# Patient Record
Sex: MALE | Race: WHITE
[De-identification: names, ages, dates, MRNs, and addresses within clinical notes are randomized per-mention and may not be internally consistent; named-entity substitution may affect disease eponyms.]

---

## 2017-02-15 ENCOUNTER — HOSPITAL ENCOUNTER (EMERGENCY)
Dept: HOSPITAL 47 - EC | Age: 67
Discharge: HOME | End: 2017-02-15
Payer: COMMERCIAL

## 2017-02-15 VITALS — DIASTOLIC BLOOD PRESSURE: 78 MMHG | HEART RATE: 80 BPM | TEMPERATURE: 98 F | SYSTOLIC BLOOD PRESSURE: 140 MMHG

## 2017-02-15 VITALS — RESPIRATION RATE: 20 BRPM

## 2017-02-15 DIAGNOSIS — S70.01XA: ICD-10-CM

## 2017-02-15 DIAGNOSIS — S06.0X0A: Primary | ICD-10-CM

## 2017-02-15 DIAGNOSIS — S40.011A: ICD-10-CM

## 2017-02-15 DIAGNOSIS — Z88.2: ICD-10-CM
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
## 2017-02-15 DIAGNOSIS — Z88.5: ICD-10-CM

## 2017-02-15 PROCEDURE — 99284 EMERGENCY DEPT VISIT MOD MDM: CPT

## 2017-02-15 PROCEDURE — 72125 CT NECK SPINE W/O DYE: CPT

## 2017-02-15 PROCEDURE — 96372 THER/PROPH/DIAG INJ SC/IM: CPT

## 2017-02-15 PROCEDURE — 73030 X-RAY EXAM OF SHOULDER: CPT

## 2017-02-15 PROCEDURE — 70450 CT HEAD/BRAIN W/O DYE: CPT

## 2017-02-15 PROCEDURE — 73502 X-RAY EXAM HIP UNI 2-3 VIEWS: CPT

## 2017-02-15 NOTE — ED
General Adult HPI





- General


Chief complaint: Fall


Stated complaint: Fall, [facility]


Time Seen by Provider: 02/15/17 11:09


Source: patient, RN notes reviewed, old records reviewed


Mode of arrival: ambulatory


Limitations: no limitations





- History of Present Illness


Initial comments: 





This is a 58-year-old male ER for evaluation of fall.  Patient has no 

significant 20 medical history, no headache chest pain shortness of breath or 

abdominal pain prior to fall.  Patient did have a slip and fall from states 

fall as mechanical on the ice, not on blood thinners, patient did fall 

backwards landing on right hip right shoulder and hitting head.  No loss of 

consciousness.  Patient denies any other complaints of pain.  Symptoms evaluate 

better when he is walking worse when he is sitting.





- Related Data


 Allergies











Allergy/AdvReac Type Severity Reaction Status Date / Time


 


hydrocodone [From Lortab] Allergy  Swelling Verified 02/15/17 11:38


 


Sulfa (Sulfonamide Allergy  Unknown Verified 02/15/17 11:38





Antibiotics)   Childhood  














Review of Systems


ROS Statement: 


Those systems with pertinent positive or pertinent negative responses have been 

documented in the HPI.





ROS Other: All systems not noted in ROS Statement are negative.





Past Medical History


Past Medical History: Coronary Artery Disease (CAD), Chest Pain / Angina, 

Hyperlipidemia, Hypertension, Myocardial Infarction (MI)


History of Any Multi-Drug Resistant Organisms: None Reported


Past Surgical History: Heart Catheterization With Stent


Additional Past Surgical History / Comment(s): cataract


Past Psychological History: No Psychological Hx Reported


Smoking Status: Never smoker


Past Alcohol Use History: None Reported


Past Drug Use History: None Reported





General Exam





- General Exam Comments


Initial Comments: 





No bony tenderness


Limitations: no limitations


General appearance: alert, in no apparent distress


Head exam: Present: atraumatic, normocephalic, normal inspection


Eye exam: Present: normal appearance, PERRL, EOMI.  Absent: scleral icterus, 

conjunctival injection, periorbital swelling


ENT exam: Present: normal exam, mucous membranes moist


Neck exam: Present: normal inspection.  Absent: tenderness, meningismus, 

lymphadenopathy


Respiratory exam: Present: normal lung sounds bilaterally.  Absent: respiratory 

distress, wheezes, rales, rhonchi, stridor


Cardiovascular Exam: Present: regular rate, normal rhythm, normal heart sounds.

  Absent: systolic murmur, diastolic murmur, rubs, gallop, clicks


GI/Abdominal exam: Present: soft, normal bowel sounds.  Absent: distended, 

tenderness, guarding, rebound, rigid


Extremities exam: Present: normal inspection, full ROM, normal capillary 

refill.  Absent: tenderness, pedal edema, joint swelling, calf tenderness


Back exam: Present: normal inspection


Neurological exam: Present: alert, oriented X3, CN II-XII intact


Psychiatric exam: Present: normal affect, normal mood


Skin exam: Present: warm, dry, intact, normal color.  Absent: rash





Course





 Vital Signs











  02/15/17





  10:32


 


Temperature 98.2 F


 


Pulse Rate 61


 


Respiratory 20





Rate 


 


Blood Pressure 153/73


 


O2 Sat by Pulse 100





Oximetry 














- Reevaluation(s)


Reevaluation #1: 





02/15/17 11:51


Pain at this time is improved





Medical Decision Making





- Medical Decision Making





6 emailed ESS also to follow with right hip and right shoulder contusion as 

well as closed head injury.  Patient will be discharged home to continue pain 

medication that he currently is on





- Radiology Data


Radiology results: report reviewed (CT brain C-spine negative for acute disease

, x-ray right shoulder x-ray were negative for traumatic injury), image reviewed





Disposition


Clinical Impression: 


 Fall, Contusion of right shoulder, Contusion of hip, right, Head injury





Disposition: HOME SELF-CARE


Condition: Good


Instructions:  Contusion in Adults (ED), Concussion (ED)


Referrals: 


Nonstaff,Physician [Primary Care Provider] - 1-2 days

## 2017-02-15 NOTE — CT
EXAMINATION TYPE: CT brain eloina wo con

 

DATE OF EXAM: 2/15/2017 12:22 PM

 

COMPARISON: NONE

 

HISTORY: Slip and fall on ice this am

 

CT DLP: 1788.40 mGycm, Automated exposure control for dose reduction was used.

 

CONTRAST: None

 

CT of the brain is performed utilizing 3 mm thick sections through the posterior fossa and 3 mm thick
 sections through the remaining calvarium.  

 

Study is performed within 24 hours of arrival to the hospital.

 

 No abnormal hyperdensity is present to suggest an acute intracranial hemorrhage.

No mass lesion is evident.

No acute infarcts are evident.

Ventricles and sulci are appropriate for the patient age.  

 

Paranasal sinuses and mastoid air cells within the field-of-view are clear. 

 

IMPRESSIONS:

1. No acute intracranial process.

 

CT cervical spine.

 

COMPARISON: None

 

CT of the cervical spine is performed in the axial plane at 2 mm thick sections.  Reconstructed image
s in the coronal, and sagittal plane are reviewed on the computer. 

 

No acute fractures are evident.

Vertebral body alignment is normal.

Disc space narrowing is present C5-4-5 C5-C6 C6-7. Uncovertebral joint hypertrophy has bilateral mode
rate to severe foraminal stenosis. Endplate spurring is anterior thecal sac compression. Some associa
devan disc material may be present. Uncovertebral joint hypertrophy is present C5-6 on the left with mo
derate left foraminal stenosis. Endplate changes with associated disc material is present C6-7 with a
nterior thecal sac compression. No AP spinal canal stenosis is present.

Vertebral body heights are preserved.

No spinal canal stenosis is evident.

 

IMPRESSIONS:

1. Degenerative disc changes uncovertebral joint hypertrophy and endplate spurring with associated di
sc material contributing to foraminal narrowing and anterior thecal sac compression discussed above.

2. No acute abnormality.

## 2017-02-15 NOTE — XR
EXAMINATION TYPE: XR Hip Complete RT

 

DATE OF EXAM: 2/15/2017 12:09 PM

 

COMPARISON: NONE

 

HISTORY: Pain

 

TECHNIQUE: 2 views right hip

 

FINDINGS: No acute fractures are evident. The femoral head are difficult with the acetabulum. Vascula
r calcification is present.

 

IMPRESSION:

1.  No acute osseous elements right hip. Follow-up can be performed as clinically indicated.

## 2017-02-15 NOTE — XR
EXAMINATION TYPE: XR shoulder complete RT

 

DATE OF EXAM: 2/15/2017 12:09 PM

 

COMPARISON: NONE

 

HISTORY: Pain, fall on right side

 

TECHNIQUE:  Shoulder examined in 3 views

 

FINDINGS: 

The humeral head articulates with the glenoid.

No acromioclavicular joint hypertrophy is evident. There may be some hooking with downward impingemen
t of the distal acromion

No acute fractures or dislocations are evident.

 

A follow up study can be performed 7-10 days from acute trauma for continued pain.

 

IMPRESSION:

1. No acute osseous abnormality.

2. Some osteoarthritic degenerative change and acromion variation, which could contribute to impingem
ent syndrome. Consider MRI for additional evaluation.

## 2017-03-08 ENCOUNTER — HOSPITAL ENCOUNTER (OUTPATIENT)
Dept: HOSPITAL 47 - RADUSWWP | Age: 67
Discharge: HOME | End: 2017-03-08
Payer: COMMERCIAL

## 2017-03-08 DIAGNOSIS — S83.91XA: ICD-10-CM

## 2017-03-08 DIAGNOSIS — S80.11XA: Primary | ICD-10-CM

## 2017-03-08 NOTE — XR
EXAMINATION TYPE: XR tibia fibula RT

 

DATE OF EXAM: 3/8/2017 4:33 PM

 

CLINICAL HISTORY: pain

 

TECHNIQUE:  AP and lateral images of the right tibia and fibula are obtained.

 

COMPARISON: None.

 

FINDINGS:  There is no acute fracture/dislocation evident. The joint spaces  appear within normal cartagena
its.  The overlying soft tissue appears unremarkable.

 

IMPRESSION:  

 

There is no acute fracture or dislocation seen.

 

ICD 10 NO FRACTURE, INITIAL EVALUATION

## 2017-03-08 NOTE — US
EXAMINATION TYPE: US venous doppler duplex LE RT

 

DATE OF EXAM: 3/8/2017 4:18 PM

 

COMPARISON: NONE

 

CLINICAL HISTORY: RLE Pain and Swelling 580.11XA,583.91XA. rt knee pain and swelling, no prev dvt

 

SIDE PERFORMED: right  

 

VESSELS IMAGED:

External Iliac Vein (EIV)

Common Femoral Vein

Deep Femoral Vein

Femoral Vein

Popliteal Vein

Proximal Calf Veins

 

Findings: No evidence for filling defect. The deep venous structures demonstrate normal compressibili
ty and augmentation of flow.

 

Right Leg:  neg for RLE dvt

 

 

 

Results given to Brit in the office at the time of the exam.

 

IMPRESSION:  Negative for DVT.

## 2019-01-07 ENCOUNTER — HOSPITAL ENCOUNTER (OUTPATIENT)
Dept: HOSPITAL 47 - ORWHC2ENDO | Age: 69
End: 2019-01-07
Payer: OTHER GOVERNMENT

## 2019-01-07 VITALS — RESPIRATION RATE: 16 BRPM | TEMPERATURE: 98.1 F

## 2019-01-07 VITALS — HEART RATE: 61 BPM | DIASTOLIC BLOOD PRESSURE: 86 MMHG | SYSTOLIC BLOOD PRESSURE: 132 MMHG

## 2019-01-07 VITALS — BODY MASS INDEX: 30.4 KG/M2

## 2019-01-07 DIAGNOSIS — Z88.5: ICD-10-CM

## 2019-01-07 DIAGNOSIS — Z79.82: ICD-10-CM

## 2019-01-07 DIAGNOSIS — M19.90: ICD-10-CM

## 2019-01-07 DIAGNOSIS — Z12.11: Primary | ICD-10-CM

## 2019-01-07 DIAGNOSIS — Z79.899: ICD-10-CM

## 2019-01-07 DIAGNOSIS — K21.9: ICD-10-CM

## 2019-01-07 DIAGNOSIS — E11.9: ICD-10-CM

## 2019-01-07 DIAGNOSIS — Z88.2: ICD-10-CM

## 2019-01-07 DIAGNOSIS — I25.10: ICD-10-CM

## 2019-01-07 DIAGNOSIS — I10: ICD-10-CM

## 2019-01-07 DIAGNOSIS — I25.2: ICD-10-CM

## 2019-01-07 DIAGNOSIS — Z86.010: ICD-10-CM

## 2019-01-07 DIAGNOSIS — K29.50: ICD-10-CM

## 2019-01-07 DIAGNOSIS — F17.200: ICD-10-CM

## 2019-01-07 DIAGNOSIS — Z79.84: ICD-10-CM

## 2019-01-07 DIAGNOSIS — E78.5: ICD-10-CM

## 2019-01-07 DIAGNOSIS — K44.9: ICD-10-CM

## 2019-01-07 LAB — GLUCOSE BLD-MCNC: 122 MG/DL (ref 75–99)

## 2019-01-07 PROCEDURE — 45378 DIAGNOSTIC COLONOSCOPY: CPT

## 2019-01-07 PROCEDURE — 88305 TISSUE EXAM BY PATHOLOGIST: CPT

## 2019-01-07 PROCEDURE — 88342 IMHCHEM/IMCYTCHM 1ST ANTB: CPT

## 2019-01-07 PROCEDURE — 43239 EGD BIOPSY SINGLE/MULTIPLE: CPT

## 2019-01-07 NOTE — P.PCN
Date of Procedure: 01/07/19


Procedure(s) Performed: 


Procedure: 1. Esophagogastroduodenoscopy and biopsy.  2. Total colonoscopy.





Preoperative diagnosis: Dysphagia and history of polyps.





Postoperative diagnosis: 1. Small sliding hiatal hernia with no obvious 

esophagitis or complicated reflux disease.  2. Mild antral gastritis.  3. 

Biopsies obtained from the antrum and esophagus.  Colonic exam within normal 

limits.





Preparation: HalfLytely prep.





Sedation: Was provided by anesthesia.





Brief clinical history: The patient is a 68-year-old male who is referred for 

this evaluation because of history of polyps in addition to dysphagia for the 

last year or so.  His last colonoscopy may have been around 10 years ago.  The 

patient denied weight loss or other alarm symptoms.  He has taken medical 

therapy for acid reflux.





Procedure: With the patient on his left lateral decubitus position and after 

informed consent and adequate sedation, I passed the [employer] H190 video 

upper endoscope through the cricopharyngeus down the esophagus.  GE junction 

was around 40 cm from the incisors and there was a small sliding hiatal hernia 

but no obvious esophagitis or complicated reflux disease.  The endoscope was 

then passed into the stomach which was insufflated with air and inspected in 

detail including the retroflex view in the cardia.  There was some mottling and 

erythema in the antrum but no ulcers or erosions.  Pyloric channel, duodenal 

bulb, post bulbar area and descending duodenum appeared within normal limits.  

I obtained biopsies from the antrum and esophagus then the endoscope was 

withdrawn I then then proceeded with the colonoscopy.





Perianal area did not show any fissures or fistulas.  There were no masses felt 

on digital rectal examination.  The Olympus CFH 190L video colonoscope was then 

inserted in the rectum in the usual fashion and advanced to the cecum.  The 

mucosa appeared healthy.  No obvious polyps or tumors were seen or any obvious 

diverticular disease or other pathology.  I retroflexed the endoscope in the 

rectum before the endoscope was withdrawn.





The patient tolerated the procedure well.





Plan: The patient was reassured.  Will await biopsy results.  I recommended 

repeat colonoscopy in 5 years.  Further workup of his dysphagia to include 

motility studies will depend on his symptoms and overall nutritional status.

## 2020-02-23 ENCOUNTER — HOSPITAL ENCOUNTER (OUTPATIENT)
Dept: HOSPITAL 47 - EC | Age: 70
Setting detail: OBSERVATION
LOS: 1 days | Discharge: HOME | End: 2020-02-24
Attending: INTERNAL MEDICINE | Admitting: INTERNAL MEDICINE
Payer: OTHER GOVERNMENT

## 2020-02-23 VITALS — RESPIRATION RATE: 18 BRPM

## 2020-02-23 DIAGNOSIS — I44.0: ICD-10-CM

## 2020-02-23 DIAGNOSIS — M19.90: ICD-10-CM

## 2020-02-23 DIAGNOSIS — F32.9: ICD-10-CM

## 2020-02-23 DIAGNOSIS — I25.10: ICD-10-CM

## 2020-02-23 DIAGNOSIS — Z88.5: ICD-10-CM

## 2020-02-23 DIAGNOSIS — R00.1: ICD-10-CM

## 2020-02-23 DIAGNOSIS — Z79.82: ICD-10-CM

## 2020-02-23 DIAGNOSIS — Z79.84: ICD-10-CM

## 2020-02-23 DIAGNOSIS — M10.9: ICD-10-CM

## 2020-02-23 DIAGNOSIS — R07.89: Primary | ICD-10-CM

## 2020-02-23 DIAGNOSIS — Z79.899: ICD-10-CM

## 2020-02-23 DIAGNOSIS — Z98.42: ICD-10-CM

## 2020-02-23 DIAGNOSIS — Z88.2: ICD-10-CM

## 2020-02-23 DIAGNOSIS — I10: ICD-10-CM

## 2020-02-23 DIAGNOSIS — E11.9: ICD-10-CM

## 2020-02-23 DIAGNOSIS — F41.9: ICD-10-CM

## 2020-02-23 DIAGNOSIS — T44.7X5A: ICD-10-CM

## 2020-02-23 DIAGNOSIS — Z87.891: ICD-10-CM

## 2020-02-23 DIAGNOSIS — E78.00: ICD-10-CM

## 2020-02-23 DIAGNOSIS — Z95.5: ICD-10-CM

## 2020-02-23 DIAGNOSIS — Z82.49: ICD-10-CM

## 2020-02-23 DIAGNOSIS — I25.2: ICD-10-CM

## 2020-02-23 DIAGNOSIS — K21.9: ICD-10-CM

## 2020-02-23 DIAGNOSIS — E78.5: ICD-10-CM

## 2020-02-23 DIAGNOSIS — Z98.41: ICD-10-CM

## 2020-02-23 LAB
ALBUMIN SERPL-MCNC: 4.5 G/DL (ref 3.5–5)
ALP SERPL-CCNC: 77 U/L (ref 38–126)
ALT SERPL-CCNC: 46 U/L (ref 4–49)
ANION GAP SERPL CALC-SCNC: 8 MMOL/L
APTT BLD: 25.8 SEC (ref 22–30)
AST SERPL-CCNC: 36 U/L (ref 17–59)
BASOPHILS # BLD AUTO: 0 K/UL (ref 0–0.2)
BASOPHILS NFR BLD AUTO: 1 %
BUN SERPL-SCNC: 17 MG/DL (ref 9–20)
CALCIUM SPEC-MCNC: 9.6 MG/DL (ref 8.4–10.2)
CHLORIDE SERPL-SCNC: 103 MMOL/L (ref 98–107)
CO2 SERPL-SCNC: 25 MMOL/L (ref 22–30)
EOSINOPHIL # BLD AUTO: 0.3 K/UL (ref 0–0.7)
EOSINOPHIL NFR BLD AUTO: 6 %
ERYTHROCYTE [DISTWIDTH] IN BLOOD BY AUTOMATED COUNT: 4.53 M/UL (ref 4.3–5.9)
ERYTHROCYTE [DISTWIDTH] IN BLOOD: 13.4 % (ref 11.5–15.5)
GLUCOSE SERPL-MCNC: 131 MG/DL (ref 74–99)
HCT VFR BLD AUTO: 42.4 % (ref 39–53)
HGB BLD-MCNC: 14.3 GM/DL (ref 13–17.5)
INR PPP: 1 (ref ?–1.2)
LYMPHOCYTES # SPEC AUTO: 1.4 K/UL (ref 1–4.8)
LYMPHOCYTES NFR SPEC AUTO: 27 %
MAGNESIUM SPEC-SCNC: 1.8 MG/DL (ref 1.6–2.3)
MCH RBC QN AUTO: 31.6 PG (ref 25–35)
MCHC RBC AUTO-ENTMCNC: 33.7 G/DL (ref 31–37)
MCV RBC AUTO: 93.6 FL (ref 80–100)
MONOCYTES # BLD AUTO: 0.3 K/UL (ref 0–1)
MONOCYTES NFR BLD AUTO: 6 %
NEUTROPHILS # BLD AUTO: 2.9 K/UL (ref 1.3–7.7)
NEUTROPHILS NFR BLD AUTO: 58 %
PLATELET # BLD AUTO: 125 K/UL (ref 150–450)
POTASSIUM SERPL-SCNC: 4.2 MMOL/L (ref 3.5–5.1)
PROT SERPL-MCNC: 7.1 G/DL (ref 6.3–8.2)
PT BLD: 10.5 SEC (ref 9–12)
SODIUM SERPL-SCNC: 136 MMOL/L (ref 137–145)
WBC # BLD AUTO: 5 K/UL (ref 3.8–10.6)

## 2020-02-23 PROCEDURE — 96374 THER/PROPH/DIAG INJ IV PUSH: CPT

## 2020-02-23 PROCEDURE — 71046 X-RAY EXAM CHEST 2 VIEWS: CPT

## 2020-02-23 PROCEDURE — 85025 COMPLETE CBC W/AUTO DIFF WBC: CPT

## 2020-02-23 PROCEDURE — 99285 EMERGENCY DEPT VISIT HI MDM: CPT

## 2020-02-23 PROCEDURE — 85730 THROMBOPLASTIN TIME PARTIAL: CPT

## 2020-02-23 PROCEDURE — 85610 PROTHROMBIN TIME: CPT

## 2020-02-23 PROCEDURE — 84484 ASSAY OF TROPONIN QUANT: CPT

## 2020-02-23 PROCEDURE — 80053 COMPREHEN METABOLIC PANEL: CPT

## 2020-02-23 PROCEDURE — 80061 LIPID PANEL: CPT

## 2020-02-23 PROCEDURE — 36415 COLL VENOUS BLD VENIPUNCTURE: CPT

## 2020-02-23 PROCEDURE — 83735 ASSAY OF MAGNESIUM: CPT

## 2020-02-23 PROCEDURE — 93458 L HRT ARTERY/VENTRICLE ANGIO: CPT

## 2020-02-23 PROCEDURE — 93005 ELECTROCARDIOGRAM TRACING: CPT

## 2020-02-23 NOTE — ED
Chest Pain HPI





- General


Source: patient


Mode of arrival: ambulatory


Limitations: no limitations





<Adriana Davila - Last Filed: 02/23/20 23:36>





<Carmella Faye - Last Filed: 02/24/20 23:28>





- General


Chief Complaint: Chest Pain


Stated Complaint: Chest pain


Time Seen by Provider: 02/23/20 21:15





- History of Present Illness


Initial Comments: 





Patient is a 69-year-old male, with past medical history of heart disease, 

presenting to the emergency Department with complaints of a chest pain that 

started 1 hour prior to arrival.  Patient states he describes the pain as 

starting in the middle of his chest and it feels like it is burning that is 

moving upwards.  Patient states it lasted for proximally 10 minutes and then has

subsided.  He is currently pain-free.  He states he has had a heart attack in 

the past and having to have 3 stents placed and he feels like this feeling was 

similar to that.  Patient states he does take a medicine for heartburn.  He 

states he gets this feeling on multiple occasions sometimes it lasts only 10 

minutes sometimes 1 minute.  He denies any chest pressure or pain in his left a

rm.  He denies any nausea, vomiting, abdominal pain, shortness of breath.  He 

denies having a fever.  He has no other complaints at this time.  Upon arrival 

to the ER his vital signs are stable. (Adriana Davila)





- Related Data


                                Home Medications











 Medication  Instructions  Recorded  Confirmed


 


Atorvastatin [Lipitor] 40 mg PO HS 05/31/18 02/24/20


 


Lisinopril [Prinivil] 10 mg PO DAILY 05/31/18 02/24/20


 


Omeprazole [PriLOSEC] 20 mg PO AC-BID 05/31/18 02/24/20


 


metFORMIN HCL [Glucophage] 500 mg PO DAILY 05/31/18 02/24/20


 


Aspirin 325 mg PO DAILY 12/28/18 02/24/20


 


Cholecalciferol [Vitamin D3 (25 1,000 unit PO DAILY 02/24/20 02/24/20





Mcg = 1000 Iu)]   











                                    Allergies











Allergy/AdvReac Type Severity Reaction Status Date / Time


 


hydrocodone [From Lortab] Allergy  Swelling Verified 02/24/20 09:36


 


Sulfa (Sulfonamide Allergy  Unknown Verified 02/24/20 09:36





Antibiotics)   Childhood  


 


tramadol Allergy  throat and Verified 02/24/20 09:36





   mouth  





   swelling  














Review of Systems


ROS Other: All systems not noted in ROS Statement are negative.





<LolaAdriana ARLIN - Last Filed: 02/23/20 23:36>


ROS Other: All systems not noted in ROS Statement are negative.





<Carmella Faye - Last Filed: 02/24/20 23:28>


ROS Statement: 


Those systems with pertinent positive or pertinent negative responses have been 

documented in the HPI.








EKG Findings





- EKG Comments:


EKG Findings:: Ventricular rate 56, LA interval 240, .  Sinus bradycardia

with first-degree AV block.  No acute ST segment elevations.





<Adriana Davila - Last Filed: 02/23/20 23:36>





Past Medical History


Past Medical History: Coronary Artery Disease (CAD), Diabetes Mellitus, 

GERD/Reflux, Hyperlipidemia, Hypertension, Myocardial Infarction (MI), 

Osteoarthritis (OA), Skin Disorder


Additional Past Medical History / Comment(s): palpitations, 

constipation/diarrhea, has had blood in stool, hx gout, skin tags,


Last Myocardial Infarction Date:: 2015


History of Any Multi-Drug Resistant Organisms: None Reported


Past Surgical History: Heart Catheterization With Stent, Tonsillectomy


Additional Past Surgical History / Comment(s): chance cataracts, 3 cardiac stents


Past Anesthesia/Blood Transfusion Reactions: No Reported Reaction


Date of Last Stent Placement:: unknown


Past Psychological History: Anxiety, Depression


Smoking Status: Former smoker


Past Alcohol Use History: None Reported


Past Drug Use History: None Reported





- Past Family History


  ** Mother


Family Medical History: Myocardial Infarction (MI)





  ** Father


Family Medical History: Congestive Heart Failure (CHF)





<Adriana Davila - Last Filed: 02/23/20 23:36>





General Exam


Limitations: no limitations





<Adriana Davila - Last Filed: 02/23/20 23:36>





- General Exam Comments


Initial Comments: 





GENERAL: 


Well-appearing, well-nourished and in no acute distress.





HEAD: 


Atraumatic, normocephalic.





EYES:


Pupils equal round and reactive to light, extraocular movements intact, sclera 

anicteric, conjunctiva are normal.





ENT: 


TMs normal, nares patent, oropharynx clear without exudates.  Moist mucous 

membranes.





NECK: 


Normal range of motion, supple without lymphadenopathy or JVD.





LUNGS:


 Breath sounds clear to auscultation bilaterally and equal.  No wheezes rales or

rhonchi.





HEART:


Regular rate and rhythm without murmurs, rubs or gallops.  No pain with 

palpation of the sternum.





ABDOMEN: 


Soft, nontender, normoactive bowel sounds.  No guarding, no rebound.  No masses 

appreciated.





: Deferred 





EXTREMITIES: 


Normal range of motion, no pitting or edema.  No clubbing or cyanosis.





NEUROLOGICAL: 


Normal speech, normal gait.





PSYCH:


Normal mood, normal affect.





SKIN:


 Warm, Dry, normal turgor, no rashes or lesions noted. (Adriana Davila)





Course





                                   Vital Signs











  02/23/20 02/23/20





  20:37 22:07


 


Temperature 98.3 F 97.4 F L


 


Pulse Rate 58 L 54 L


 


Respiratory 16 18





Rate  


 


Blood Pressure 145/82 129/88


 


O2 Sat by Pulse 96 96





Oximetry  














Chest Pain [facility]





<Adriana Davila - Last Filed: 02/23/20 23:36>





<Carmella Faye - Last Filed: 02/24/20 23:28>





- [facility]





Patient is a 69-year-old male presenting with chest discomfort happen one hour 

prior to arrival.  He states this lasted approximately 10 minutes and is 

currently symptom free.  His exam is unremarkable.  Vital signs are stable.  EKG

shows bradycardia with first-degree AV block, no other acute abnormalities.  

Patient's lab work today is unremarkable, troponin is normal.  Chest x-ray shows

no acute findings.  I discussed these findings with the patient.  Patient was 

reassessed and continues to be symptom free.  Patient does have significant 

heart history and given this with his symptom onset I recommended him stay in 

observation with cardiac consult.  Patient is in agreement with this.  Patient 

will be admitted under Dr. Pro with consult to cardiology.  Case discussed with

Dr. Faye who agrees to this plan of care. (Adriana Davila)


I was available for consultation in the emergency department.  The history and 

physical exam were done by the midlevel provider. I was consulted for this 

patients care. I reviewed the case with the midlevel provider and based on 

their presentation of the patient, I agree with the assessment, medical decision

making and plan of care as documented. The patient presents with chest pain. I 

evaluated him myself and he was pain free with no reproducible pain. We 

recommended hospital admission for which the patient agreed. He was admitted to 

Dr. Pro who i discussed the case with. 


Chart was dictated using Dragon dictation software.  Attempts were made to 

correct any dictation errors however some typographical errors may persist. 


 (Carmella Faye)





Disposition


Is patient prescribed a controlled substance at d/c from ED?: No


Decision Date: 02/23/20


Decision Time: 22:40





<Adriana Davila - Last Filed: 02/23/20 23:36>





<Carmella Faye - Last Filed: 02/24/20 23:28>


Clinical Impression: 


 Chest pain





Disposition: ADMITTED AS IP TO THIS HOSP


Condition: Stable

## 2020-02-23 NOTE — XR
EXAMINATION TYPE: XR chest 2V

 

DATE OF EXAM: 2/23/2020

 

COMPARISON: 11/3/2013

 

HISTORY: Chest pain

 

TECHNIQUE:

 

FINDINGS: Heart is normal. Lungs are clear of infiltrate. There is no pleural effusion. There are no 
hilar masses. Thoracic aorta is atheromatous. Bony thorax is intact.

 

IMPRESSION: No active cardiopulmonary disease. No change.

## 2020-02-24 VITALS — SYSTOLIC BLOOD PRESSURE: 118 MMHG | HEART RATE: 61 BPM | DIASTOLIC BLOOD PRESSURE: 66 MMHG

## 2020-02-24 VITALS — TEMPERATURE: 97.6 F

## 2020-02-24 LAB
CHOLEST SERPL-MCNC: 159 MG/DL (ref ?–200)
GLUCOSE BLD-MCNC: 135 MG/DL (ref 75–99)
GLUCOSE BLD-MCNC: 137 MG/DL (ref 75–99)
GLUCOSE BLD-MCNC: 142 MG/DL (ref 75–99)
HDLC SERPL-MCNC: 33 MG/DL (ref 40–60)
LDLC SERPL CALC-MCNC: 58 MG/DL (ref 0–99)
TRIGL SERPL-MCNC: 339 MG/DL (ref ?–150)

## 2020-02-24 RX ADMIN — INSULIN ASPART SCH: 100 INJECTION, SOLUTION INTRAVENOUS; SUBCUTANEOUS at 14:17

## 2020-02-24 RX ADMIN — INSULIN ASPART SCH: 100 INJECTION, SOLUTION INTRAVENOUS; SUBCUTANEOUS at 17:28

## 2020-02-24 NOTE — P.HPIM
History of Present Illness


Patient came in with the complaints of epigastric abdominal burning sensation 

without nausea patient had similar pain many abdomen had a microinfarction and 

at that time he ended up with 3 stents.  Patient denied and diaphoresis asso

ciated with the denied any nausea there is any is some associated dizziness 

patient denied any cough patient chest pain is constant moderate in severity 

nonradiating.  Because he had similar pain in the past cardiology is 

recommending cardiac catheterization patient is willing to go for cardiac cath

eterization today.  Although gastroesophageal reflux disease or gastritis 

continue bit into his symptoms cannot be ruled out patient is oriented Protonix 

y EKG showing sinus bradycardia.  Chest x-ray did not show any infiltrate.








Review of Systems








REVIEW OF SYSTEMS: 


CONSTITUTIONAL: No fever, no malaise, no fatigue. 


HEENT: No recent visual problems or hearing problems. Denied any sore throat. 


CARDIOVASCULAR: No  orthopnea, PND, no palpitations, no syncope. 


PULMONARY: No shortness of breath, no cough, no hemoptysis. 


GASTROINTESTINAL: No diarrhea, no nausea, no vomiting, no abdominal pain. 


NEUROLOGICAL: No headaches, no weakness, no numbness. 


HEMATOLOGICAL: Denies any bleeding or petechiae. 


GENITOURINARY: Denies any burning micturition, frequency, or urgency. 


MUSCULOSKELETAL/RHEUMATOLOGICAL: Denies any joint pain, swelling, or any muscle 

pain. 


ENDOCRINE: Denies any polyuria or polydipsia. 





The rest of the 14-point review of systems is negative.











Past Medical History


Past Medical History: Coronary Artery Disease (CAD), Diabetes Mellitus, 

GERD/Reflux, Hyperlipidemia, Hypertension, Myocardial Infarction (MI), 

Osteoarthritis (OA), Skin Disorder


Additional Past Medical History / Comment(s): palpitations, 

constipation/diarrhea, has had blood in stool, hx gout, skin tags,


Last Myocardial Infarction Date:: 2015


History of Any Multi-Drug Resistant Organisms: None Reported


Past Surgical History: Heart Catheterization With Stent, Tonsillectomy


Additional Past Surgical History / Comment(s): chance cataracts, 3 cardiac stents


Past Anesthesia/Blood Transfusion Reactions: No Reported Reaction


Date of Last Stent Placement:: unknown


Past Psychological History: Anxiety, Depression


Smoking Status: Former smoker


Past Alcohol Use History: None Reported


Past Drug Use History: None Reported





- Past Family History


  ** Mother


Family Medical History: Myocardial Infarction (MI)





  ** Father


Family Medical History: Congestive Heart Failure (CHF)





Medications and Allergies


                                Home Medications











 Medication  Instructions  Recorded  Confirmed  Type


 


Atorvastatin [Lipitor] 40 mg PO HS 05/31/18 02/24/20 History


 


Lisinopril [Prinivil] 10 mg PO DAILY 05/31/18 02/24/20 History


 


Metoprolol Tartrate [Lopressor] 12.5 mg PO BID 05/31/18 02/24/20 History


 


Omeprazole [PriLOSEC] 20 mg PO AC-BID 05/31/18 02/24/20 History


 


metFORMIN HCL [Glucophage] 500 mg PO DAILY 05/31/18 02/24/20 History


 


Aspirin 325 mg PO DAILY 12/28/18 02/24/20 History


 


Cholecalciferol [Vitamin D3 (25 1,000 unit PO DAILY 02/24/20 02/24/20 History





Mcg = 1000 Iu)]    








                                    Allergies











Allergy/AdvReac Type Severity Reaction Status Date / Time


 


hydrocodone [From Lortab] Allergy  Swelling Verified 02/24/20 09:36


 


Sulfa (Sulfonamide Allergy  Unknown Verified 02/24/20 09:36





Antibiotics)   Childhood  


 


tramadol Allergy  throat and Verified 02/24/20 09:36





   mouth  





   swelling  














Physical Exam


Vitals: 


                                   Vital Signs











  Temp Pulse Pulse Resp BP BP Pulse Ox


 


 02/24/20 08:00  97.6 F   50 L  18   115/68  96


 


 02/24/20 04:00  97.5 F L   46 L  18   114/66  96


 


 02/24/20 00:00    54 L  18   


 


 02/23/20 23:45  97.4 F L   54 L  18   143/95  99


 


 02/23/20 22:07  97.4 F L  54 L   18  129/88   96


 


 02/23/20 20:37  98.3 F  58 L   16  145/82   96








                                Intake and Output











 02/23/20 02/24/20 02/24/20





 22:59 06:59 14:59


 


Other:   


 


  Voiding Method   Toilet


 


  Weight 90.718 kg 92.3 kg 

















PHYSICAL EXAMINATION: 





GENERAL: The patient is alert and oriented x3, not in any acute distress. Well 

developed, well nourished. 


HEENT: Pupils are round and equally reacting to light. EOMI. No scleral icterus.

No conjunctival pallor. Normocephalic, atraumatic. No pharyngeal erythema. No 

thyromegaly. 


CARDIOVASCULAR: S1 and S2 present. No murmurs, rubs, or gallops. 


PULMONARY: Chest is clear to auscultation, no wheezing or crackles. 


ABDOMEN: Soft, nontender, nondistended, normoactive bowel sounds. No palpable 

organomegaly. 


MUSCULOSKELETAL: No joint swelling or deformity.


EXTREMITIES: No cyanosis, clubbing, or pedal edema. 


NEUROLOGICAL: Gross neurological examination did not reveal any focal deficits. 


SKIN: No rashes. 

















Results


CBC & Chem 7: 


                                 02/23/20 20:48





                                 02/23/20 20:48


Labs: 


                  Abnormal Lab Results - Last 24 Hours (Table)











  02/23/20 02/23/20 02/24/20 Range/Units





  20:48 20:48 02:56 


 


Plt Count  125 L    (150-450)  k/uL


 


Sodium   136 L   (137-145)  mmol/L


 


Glucose   131 H   (74-99)  mg/dL


 


POC Glucose (mg/dL)     (75-99)  mg/dL


 


Triglycerides    339 H  (<150)  mg/dL


 


HDL Cholesterol    33 L  (40-60)  mg/dL














  02/24/20 Range/Units





  09:47 


 


Plt Count   (150-450)  k/uL


 


Sodium   (137-145)  mmol/L


 


Glucose   (74-99)  mg/dL


 


POC Glucose (mg/dL)  137 H  (75-99)  mg/dL


 


Triglycerides   (<150)  mg/dL


 


HDL Cholesterol   (40-60)  mg/dL














Thrombosis Risk Factor Assmnt





- Choose All That Apply


Any of the Below Risk Factors Present?: Yes


Each Factor Represents 1 point: Acute MI, Obesity (BMI >25)


Other Risk Factors: Yes


Each Risk Factor Represents 2 Points: Age 61-74 years


Other congenital or acquired thrombophilia - If yes, enter type in comment: No


Thrombosis Risk Factor Assessment Total Risk Factor Score: 4


Thrombosis Risk Factor Assessment Level: Moderate Risk





Assessment and Plan


Plan: 


-Chest pain: Your cardiac her gases with reflux disease patient will undergo 

cardiac catheterization of this negative patient will be discharged on a 

Prilosec for 14 days.


-Coronary artery disease with previous stents in the past on metoprolol is being

held because of bradycardia a she is under dose of metoprolol very low-dose of 

metoprolol.  Probably will not require metoprolol Discharge 


 type 2 diabetes mellitus metformin will be held and the patient was started on 

sliding scale insulin


-Hypertension


-Coronary artery disease-hold off lisinopril because of low normal blood 

pressure probably he'll require this medication upon discharge

## 2020-02-24 NOTE — CC
CARDIAC CATHETERIZATION REPORT



Mr. Flores is a 69-year-old male known history of coronary artery disease, history of

hypertension, diabetes mellitus, who presented to the hospital with symptoms of

discomfort reminding him of the way he felt prior to his stenting in 2013.  He was

evaluated by Dr. Naylor, recommendation was made regarding cardiac catheterization.

The procedures, risks, and complication were discussed with the patient who is in full

understanding and agreement.



PROCEDURE:

Patient was brought to the cath lab in the fasting semi-sedated state, after receiving

fentanyl and Benadryl and achieving moderate conscious sedated state.  Using Xylocaine

anesthesia and Seldinger technique, a 6-Hungarian sheath was introduced in the right

radial artery.  Selective right and left coronary angiography was performed using 5-

Hungarian 3.5 bend right and left Raquel catheter.  Multiple views of the coronary

artery, including hemiaxial views were obtained. Following that, a 5-Hungarian tight

pigtail catheter was introduced in the left ventricle and the pressures were

calculated.  Following that, catheter and sheath were removed.  Hemostasis was obtained

with deployment of a TR band.  There was no immediate complication.  Patient is

returned to his room in stable condition.  Of note, the patient received 5000 units of

intravenous heparin as well as intra-arterial verapamil.



FINDINGS:

FLUOROSCOPY: There was severe calcification involving all the coronary arteries.



LEFT MAIN:  This is a large-sized vessel, bifurcating into left circumflex, left

anterior descending artery.  Left main coronary artery has no evidence of high-grade

stenosis.



LEFT ANTERIOR DESCENDING ARTERY:  This is a large-sized vessel.  It tapers down in

distal third, giving rise to a diagonal branch of small caliber.  At the left anterior

descending artery, stented segment in the mid area is patent.  There is about 20% to

30% plaque proximally.  The rest of the vessel has no high-grade stenosis.



LEFT CIRCUMFLEX:  This is a nondominant vessel, giving rise to a large obtuse marginal

branch.  The stented segment in the proximal left circumflex is patent.  The obtuse

marginal branch has a 30% to 40% plaque in the distal area.  The rest of the vessel has

no high-grade stenosis.  The proximal left circumflex has a 30% plaque.



RIGHT CORONARY ARTERY: This is a large dominant vessel, bifurcating to PDA and

posterolateral segment and branches, heavily calcified throughout its course.  The

right coronary artery throughout its course has diffuse intimal disease with area of

stenosis up to 30% to 40% without any evidence of high-grade stenosis.



LEFT VENTRICULOGRAM:

Left ventriculogram is not performed.



HEMODYNAMICS:

There was no gradient across the aortic valve. The ventricular end-diastolic pressure

was 14-16 mmHg.



CONCLUSION:

1. Patent stent to the LAD and to the left circumflex.

2. Calcified coronary arteries.

3. Moderate triple-vessel coronary artery disease.



RECOMMENDATION:

In view of finding anatomy, recommend continue medical therapy, the right coronary

artery did not show any significant progression since 2013.  I will maximize his

medical therapy, he may benefit from a myocardial perfusion imaging as an outpatient to

further evaluate his coronary perfusion and depending on his progress, further

recommendations will be made. Those findings and recommendation were discussed with the

patient who is in full understanding and agreement.



Duration of the procedure is 18 minutes.





MMODL / IJN: [id number] / Job#: [id number]

## 2020-02-24 NOTE — P.CRDCN
History of Present Illness


History of present illness: 





This is Dr. Naylor dictating a consult on this patient


The patient was interviewed and examined by me





IMPRESSION / ASSESSMENT: 


Midsternal chest discomfort radiating up into the jaw and neck, associated with 

dizziness, similar to his episode when he had acute myocardial infarction and 3 

stents placed


Type 2 diabetes


Hypertension


Normal cardiac enzymes


Elevated triglycerides of 339, LDL 58 and HDL 33


Patient states he had a coronary stent in this hospital but I don't see any 

records of that in the EMR





PLAN:


Coronary angiography versus stress testing.  Discussed the patient.  I would 

recommend coronary angiography since his symptoms are very similar to what he 

experienced 3 years back


Continue cardiac medications





HPI


Patient was sitting watching television when he started experiencing midsternal 

chest discomfort that radiated to the neck and was associated with dizziness.  

The episode lasted for about 10 minutes.  He describes it as a burning 

discomfort similar to his heart attack 3 years back.


Normal cardiac enzymes


On appropriate medical treatment atorvastatin lisinopril, metoprolol and 

metformin and aspirin





ROS: 


No fever chills or rigors, 


no cough, phlegm or expectoration, 


no nausea, vomiting or diarrhea, 


no hematuria, dysuria, 


no musculoskeletal complaints, 


no strokes or seizures, 


no skin lesions.





EXAMINATION:


Afebrile pulse rate in the 50s blood pressure 115/68 mmHg


Breath sounds are clear no rhonchi no crackles


Normal heart sounds no murmurs no gallops


No carotid bruits no JVD


Patient is lying in bed comfortably, supine


Abdomen soft nontender


Complains of intermittent discomfort in the right groin pulses are well 

palpable.  Has been told he has an aneurysm repair





REVIEW OF LABS, ECG & MEDICAL DATA


Past history of coronary artery disease status post cardiac stenting in the 

setting of an acute myocardial infarction according to the patient, diabetes was

discovered thereafter did history of elevated triglycerides and a low HDL, 

hypertension


Coronary stenting


Normal white count, hemoglobin 14.3, platelet count 125,000


Sodium 136, potassium 4.2


Serial cardiac enzymes are normal


Triglycerides 339, LDL 58 HDL 33 total.  159





Twelve-lead ECG shows sinus rhythm mildly prolonged IN interval normal ST 

segments, isolated inverted T-wave in lead 3


Chest x-ray normal


Atherosclerosis of the thoracic aorta





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 

location of this time we will get records


He's had a peripheral angiogram in the past





















































Past Medical History


Past Medical History: Coronary Artery Disease (CAD), Diabetes Mellitus, 

GERD/Reflux, Hyperlipidemia, Hypertension, Myocardial Infarction (MI), 

Osteoarthritis (OA), Skin Disorder


Additional Past Medical History / Comment(s): palpitations, 

constipation/diarrhea, has had blood in stool, hx gout, skin tags,


Last Myocardial Infarction Date:: 2015


History of Any Multi-Drug Resistant Organisms: None Reported


Past Surgical History: Heart Catheterization With Stent, Tonsillectomy


Additional Past Surgical History / Comment(s): chance cataracts, 3 cardiac stents


Past Anesthesia/Blood Transfusion Reactions: No Reported Reaction


Date of Last Stent Placement:: unknown


Past Psychological History: Anxiety, Depression


Smoking Status: Former smoker


Past Alcohol Use History: None Reported


Past Drug Use History: None Reported





- Past Family History


  ** Mother


Family Medical History: Myocardial Infarction (MI)





  ** Father


Family Medical History: Congestive Heart Failure (CHF)





Medications and Allergies


                                Home Medications











 Medication  Instructions  Recorded  Confirmed  Type


 


Atorvastatin [Lipitor] 40 mg PO HS 05/31/18 01/07/19 History


 


Hydrocodone/Acetaminophen [Vicodin 1 each PO BID PRN 05/31/18 01/07/19 History





Es 7.5-300 mg Tablet]    


 


Lisinopril [Prinivil] 5 mg PO BID 05/31/18 01/07/19 History


 


Metoprolol Tartrate [Lopressor] 12.5 mg PO BID 05/31/18 01/07/19 History


 


Omeprazole [PriLOSEC] 20 mg PO AC-BID 05/31/18 01/07/19 History


 


metFORMIN HCL [Glucophage] 500 mg PO DAILY 05/31/18 01/07/19 History


 


Aspirin 325 mg PO DAILY 12/28/18 01/07/19 History








                                    Allergies











Allergy/AdvReac Type Severity Reaction Status Date / Time


 


hydrocodone [From Lortab] Allergy  Swelling Verified 02/23/20 20:41


 


Sulfa (Sulfonamide Allergy  Unknown Verified 02/23/20 20:41





Antibiotics)   Childhood  


 


tramadol Allergy  throat and Verified 02/23/20 20:41





   mouth  





   swelling  














Physical Exam


Vitals: 


                                   Vital Signs











  Temp Pulse Pulse Resp BP BP Pulse Ox


 


 02/24/20 08:00  97.6 F   50 L  18   115/68  96


 


 02/24/20 04:00  97.5 F L   46 L  18   114/66  96


 


 02/24/20 00:00    54 L  18   


 


 02/23/20 23:45  97.4 F L   54 L  18   143/95  99


 


 02/23/20 22:07  97.4 F L  54 L   18  129/88   96


 


 02/23/20 20:37  98.3 F  58 L   16  145/82   96








                                Intake and Output











 02/23/20 02/24/20 02/24/20





 22:59 06:59 14:59


 


Other:   


 


  Weight 90.718 kg 92.3 kg 














Results





                                 02/23/20 20:48





                                 02/23/20 20:48


                                 Cardiac Enzymes











  02/23/20 02/23/20 02/24/20 Range/Units





  20:48 20:48 02:56 


 


AST  36    (17-59)  U/L


 


Troponin I   <0.012  <0.012  (0.000-0.034)  ng/mL








                                   Coagulation











  02/23/20 Range/Units





  20:48 


 


PT  10.5  (9.0-12.0)  sec


 


APTT  25.8  (22.0-30.0)  sec








                                     Lipids











  02/24/20 Range/Units





  02:56 


 


Triglycerides  339 H  (<150)  mg/dL


 


Cholesterol  159  (<200)  mg/dL


 


HDL Cholesterol  33 L  (40-60)  mg/dL








                                       CBC











  02/23/20 Range/Units





  20:48 


 


WBC  5.0  (3.8-10.6)  k/uL


 


RBC  4.53  (4.30-5.90)  m/uL


 


Hgb  14.3  (13.0-17.5)  gm/dL


 


Hct  42.4  (39.0-53.0)  %


 


Plt Count  125 L  (150-450)  k/uL








                          Comprehensive Metabolic Panel











  02/23/20 Range/Units





  20:48 


 


Sodium  136 L  (137-145)  mmol/L


 


Potassium  4.2  (3.5-5.1)  mmol/L


 


Chloride  103  ()  mmol/L


 


Carbon Dioxide  25  (22-30)  mmol/L


 


BUN  17  (9-20)  mg/dL


 


Creatinine  0.84  (0.66-1.25)  mg/dL


 


Glucose  131 H  (74-99)  mg/dL


 


Calcium  9.6  (8.4-10.2)  mg/dL


 


AST  36  (17-59)  U/L


 


ALT  46  (4-49)  U/L


 


Alkaline Phosphatase  77  ()  U/L


 


Total Protein  7.1  (6.3-8.2)  g/dL


 


Albumin  4.5  (3.5-5.0)  g/dL








                               Current Medications











Generic Name Dose Route Start Last Admin





  Trade Name Freq  PRN Reason Stop Dose Admin


 


Aspirin  325 mg  02/24/20 09:00 





  Aspirin  PO  





  DAILY ERASMO  


 


Nitroglycerin  0.4 mg  02/23/20 22:38 





  Nitrostat  SUBLINGUAL  





  Q5M PRN  





  Chest Pain  








                                Intake and Output











 02/23/20 02/24/20 02/24/20





 22:59 06:59 14:59


 


Other:   


 


  Weight 90.718 kg 92.3 kg 








                                        





                                 02/23/20 20:48 





                                 02/23/20 20:48

## 2020-02-28 NOTE — P.DS
Providers


Date of admission: 


02/23/20 22:37





Expected date of discharge: 02/24/20


Attending physician: 


Zenon Sunshine MD





Consults: 





                                        





02/23/20 22:38


Consult Physician Urgent 


   Consulting Provider: Dalton Andrews


   Consult Reason/Comments: chest pain


   Do you want consulting provider notified?: Yes, Notify in am











Primary care physician: 


Evelin Chappellmichelle





[facility] Course: 


Patient had a cardiac catheterization which did not show any significant 

atherosclerotic occlusive disease was subsequently discharged metoprolol was 

discontinued because of sinus bradycardia.





The rest of the details please refer to my dictation of [facility] [place]





Patient Condition at Discharge: Stable





Plan - Discharge Summary


Discharge Rx Participant: No


New Discharge Prescriptions: 


Discontinued


   Metoprolol Tartrate [Lopressor] 12.5 mg PO BID





No Action


   metFORMIN HCL [Glucophage] 500 mg PO DAILY


   Omeprazole [PriLOSEC] 20 mg PO AC-BID


   Lisinopril [Prinivil] 10 mg PO DAILY


   Atorvastatin [Lipitor] 40 mg PO HS


   Aspirin 325 mg PO DAILY


   Cholecalciferol [Vitamin D3 (25 Mcg = 1000 Iu)] 1,000 unit PO DAILY


Discharge Medication List





Atorvastatin [Lipitor] 40 mg PO HS 05/31/18 [History]


Lisinopril [Prinivil] 10 mg PO DAILY 05/31/18 [History]


Omeprazole [PriLOSEC] 20 mg PO AC-BID 05/31/18 [History]


metFORMIN HCL [Glucophage] 500 mg PO DAILY 05/31/18 [History]


Aspirin 325 mg PO DAILY 12/28/18 [History]


Cholecalciferol [Vitamin D3 (25 Mcg = 1000 Iu)] 1,000 unit PO DAILY 02/24/20 

[History]








Follow up Appointment(s)/Referral(s): 


Noah Naylor MD [STAFF PHYSICIAN] - 1 Week


(Follow-up with Dr. Naylor/Nuha Carranza/[facility]


**Office will call you with appointment.)


Evelin Jenkins MD [Primary Care Provider] - 1-2 days


Patient Instructions/Handouts:  *Surgery [facility] - After Heart Catheterization - 

Ambulatory Care Instructions


Activity/Diet/Wound Care/Special Instructions: 


See activity restriction instructions


Discharge Disposition: HOME SELF-CARE